# Patient Record
Sex: MALE | HISPANIC OR LATINO | URBAN - METROPOLITAN AREA
[De-identification: names, ages, dates, MRNs, and addresses within clinical notes are randomized per-mention and may not be internally consistent; named-entity substitution may affect disease eponyms.]

---

## 2017-04-24 ENCOUNTER — APPOINTMENT (OUTPATIENT)
Age: 57
Setting detail: DERMATOLOGY
End: 2017-04-26

## 2017-04-24 DIAGNOSIS — L81.4 OTHER MELANIN HYPERPIGMENTATION: ICD-10-CM

## 2017-04-24 DIAGNOSIS — D18.0 HEMANGIOMA: ICD-10-CM

## 2017-04-24 PROBLEM — D18.01 HEMANGIOMA OF SKIN AND SUBCUTANEOUS TISSUE: Status: ACTIVE | Noted: 2017-04-24

## 2017-04-24 PROCEDURE — OTHER PRODUCT LINE (HYPERPIGMENTATION): OTHER

## 2017-04-24 PROCEDURE — 99203 OFFICE O/P NEW LOW 30 MIN: CPT

## 2017-04-24 PROCEDURE — OTHER COUNSELING: OTHER

## 2017-04-24 ASSESSMENT — LOCATION DETAILED DESCRIPTION DERM
LOCATION DETAILED: STERNAL NOTCH
LOCATION DETAILED: MID-FRONTAL SCALP
LOCATION DETAILED: RIGHT MID-UPPER BACK
LOCATION DETAILED: RIGHT CENTRAL PARIETAL SCALP

## 2017-04-24 ASSESSMENT — LOCATION SIMPLE DESCRIPTION DERM
LOCATION SIMPLE: CHEST
LOCATION SIMPLE: ANTERIOR SCALP
LOCATION SIMPLE: SCALP
LOCATION SIMPLE: RIGHT UPPER BACK

## 2017-04-24 ASSESSMENT — LOCATION ZONE DERM
LOCATION ZONE: SCALP
LOCATION ZONE: TRUNK

## 2017-04-24 NOTE — PROCEDURE: PRODUCT LINE (HYPERPIGMENTATION)
Name Of Product 4: Hydroquinone Emulsion 8%
Product 13 Price (In Dollars - Numeric Only, No Special Characters Or $): 0.00
Product 78 Units: 0
Product 2 Price (In Dollars - Numeric Only, No Special Characters Or $): 60.00
Detail Level: Zone
Product 2 Application Directions: Apply a pea size amount to dark spots on the face \\n\\nLot# [id number]\\nEXP: 05/05/2017
Product 4 Application Directions: Apply 1–2 pumps to face at bedtime
Product 4 Price (In Dollars - Numeric Only, No Special Characters Or $): 60
Name Of Product 2: Tri-Bleaching Cream
Render Product Pricing In Note: Yes
Product 3 Application Directions: Apply 1-2 pumps to face at bedtime\\n\\nlot#: [id number]\\nExp: 09/28/17
Product 3 Price (In Dollars - Numeric Only, No Special Characters Or $): 50
Name Of Product 3: Hydroquinone Emulsion 4%
Product 1 Application Directions: Apply to the pigmented areas on the legs twice a day \\n\\nLot: [id number]\\nExp: 9/8/2017
Product 3 Units: 1

## 2017-04-24 NOTE — HPI: DISCOLORATION
How Severe Is Your Skin Discoloration?: mild
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 Pt states if there is nothing to do about it he considering getting tattoos on the areas.